# Patient Record
Sex: MALE | Race: BLACK OR AFRICAN AMERICAN | NOT HISPANIC OR LATINO | Employment: FULL TIME | URBAN - METROPOLITAN AREA
[De-identification: names, ages, dates, MRNs, and addresses within clinical notes are randomized per-mention and may not be internally consistent; named-entity substitution may affect disease eponyms.]

---

## 2020-11-14 ENCOUNTER — APPOINTMENT (OUTPATIENT)
Dept: RADIOLOGY | Facility: MEDICAL CENTER | Age: 55
End: 2020-11-14
Attending: EMERGENCY MEDICINE
Payer: OTHER MISCELLANEOUS

## 2020-11-14 ENCOUNTER — HOSPITAL ENCOUNTER (EMERGENCY)
Facility: MEDICAL CENTER | Age: 55
End: 2020-11-14
Attending: EMERGENCY MEDICINE
Payer: OTHER MISCELLANEOUS

## 2020-11-14 VITALS
HEART RATE: 65 BPM | WEIGHT: 245 LBS | BODY MASS INDEX: 37.13 KG/M2 | TEMPERATURE: 98.4 F | RESPIRATION RATE: 16 BRPM | OXYGEN SATURATION: 93 % | HEIGHT: 68 IN | DIASTOLIC BLOOD PRESSURE: 90 MMHG | SYSTOLIC BLOOD PRESSURE: 137 MMHG

## 2020-11-14 DIAGNOSIS — R51.9 ACUTE NONINTRACTABLE HEADACHE, UNSPECIFIED HEADACHE TYPE: ICD-10-CM

## 2020-11-14 DIAGNOSIS — R55 NEAR SYNCOPE: ICD-10-CM

## 2020-11-14 LAB
ALBUMIN SERPL BCP-MCNC: 4 G/DL (ref 3.2–4.9)
ALBUMIN/GLOB SERPL: 1.1 G/DL
ALP SERPL-CCNC: 73 U/L (ref 30–99)
ALT SERPL-CCNC: 37 U/L (ref 2–50)
ANION GAP SERPL CALC-SCNC: 12 MMOL/L (ref 7–16)
AST SERPL-CCNC: 26 U/L (ref 12–45)
BASOPHILS # BLD AUTO: 0.4 % (ref 0–1.8)
BASOPHILS # BLD: 0.02 K/UL (ref 0–0.12)
BILIRUB SERPL-MCNC: 0.3 MG/DL (ref 0.1–1.5)
BUN SERPL-MCNC: 9 MG/DL (ref 8–22)
CALCIUM SERPL-MCNC: 9.5 MG/DL (ref 8.5–10.5)
CHLORIDE SERPL-SCNC: 103 MMOL/L (ref 96–112)
CO2 SERPL-SCNC: 23 MMOL/L (ref 20–33)
CREAT SERPL-MCNC: 0.94 MG/DL (ref 0.5–1.4)
EKG IMPRESSION: NORMAL
EOSINOPHIL # BLD AUTO: 0.06 K/UL (ref 0–0.51)
EOSINOPHIL NFR BLD: 1.2 % (ref 0–6.9)
ERYTHROCYTE [DISTWIDTH] IN BLOOD BY AUTOMATED COUNT: 40.8 FL (ref 35.9–50)
GLOBULIN SER CALC-MCNC: 3.5 G/DL (ref 1.9–3.5)
GLUCOSE SERPL-MCNC: 101 MG/DL (ref 65–99)
HCT VFR BLD AUTO: 44.1 % (ref 42–52)
HGB BLD-MCNC: 15 G/DL (ref 14–18)
IMM GRANULOCYTES # BLD AUTO: 0.01 K/UL (ref 0–0.11)
IMM GRANULOCYTES NFR BLD AUTO: 0.2 % (ref 0–0.9)
LYMPHOCYTES # BLD AUTO: 1.41 K/UL (ref 1–4.8)
LYMPHOCYTES NFR BLD: 27.6 % (ref 22–41)
MCH RBC QN AUTO: 29.7 PG (ref 27–33)
MCHC RBC AUTO-ENTMCNC: 34 G/DL (ref 33.7–35.3)
MCV RBC AUTO: 87.3 FL (ref 81.4–97.8)
MONOCYTES # BLD AUTO: 0.33 K/UL (ref 0–0.85)
MONOCYTES NFR BLD AUTO: 6.5 % (ref 0–13.4)
NEUTROPHILS # BLD AUTO: 3.28 K/UL (ref 1.82–7.42)
NEUTROPHILS NFR BLD: 64.1 % (ref 44–72)
NRBC # BLD AUTO: 0 K/UL
NRBC BLD-RTO: 0 /100 WBC
PLATELET # BLD AUTO: 184 K/UL (ref 164–446)
PMV BLD AUTO: 9.1 FL (ref 9–12.9)
POTASSIUM SERPL-SCNC: 4 MMOL/L (ref 3.6–5.5)
PROT SERPL-MCNC: 7.5 G/DL (ref 6–8.2)
RBC # BLD AUTO: 5.05 M/UL (ref 4.7–6.1)
SODIUM SERPL-SCNC: 138 MMOL/L (ref 135–145)
TROPONIN T SERPL-MCNC: <6 NG/L (ref 6–19)
WBC # BLD AUTO: 5.1 K/UL (ref 4.8–10.8)

## 2020-11-14 PROCEDURE — 99285 EMERGENCY DEPT VISIT HI MDM: CPT

## 2020-11-14 PROCEDURE — 70450 CT HEAD/BRAIN W/O DYE: CPT

## 2020-11-14 PROCEDURE — 96374 THER/PROPH/DIAG INJ IV PUSH: CPT

## 2020-11-14 PROCEDURE — 71045 X-RAY EXAM CHEST 1 VIEW: CPT

## 2020-11-14 PROCEDURE — 93005 ELECTROCARDIOGRAM TRACING: CPT | Performed by: EMERGENCY MEDICINE

## 2020-11-14 PROCEDURE — 80053 COMPREHEN METABOLIC PANEL: CPT

## 2020-11-14 PROCEDURE — 85025 COMPLETE CBC W/AUTO DIFF WBC: CPT

## 2020-11-14 PROCEDURE — 700101 HCHG RX REV CODE 250: Performed by: EMERGENCY MEDICINE

## 2020-11-14 PROCEDURE — 93005 ELECTROCARDIOGRAM TRACING: CPT

## 2020-11-14 PROCEDURE — 84484 ASSAY OF TROPONIN QUANT: CPT

## 2020-11-14 RX ORDER — METOPROLOL TARTRATE 1 MG/ML
5 INJECTION, SOLUTION INTRAVENOUS ONCE
Status: COMPLETED | OUTPATIENT
Start: 2020-11-14 | End: 2020-11-14

## 2020-11-14 RX ADMIN — METOPROLOL TARTRATE 5 MG: 1 INJECTION, SOLUTION INTRAVENOUS at 08:40

## 2020-11-14 NOTE — ED TRIAGE NOTES
"Chief Complaint   Patient presents with   • Dizziness     pt bib ems from [facility] visiting from [place] c/o sudden onset of head ache/nausea then became \"woozy\" while in elevator. denies syncopal event   • Head Ache   • Nausea     Arrived aaox4. States head ache is now resolving but now c/o generalized weakness. No unilateral deficits noted. Has hx of SVT/HTN/Asthma. Has bgl of 98 on scene.  "

## 2020-11-14 NOTE — DISCHARGE INSTRUCTIONS
Go back to your hotel room and rest.  If you have another episode, develop any difficulty breathing or chest pain, palpitations, or any other concerns return to the ER.  Follow-up with your regular doctor when you get home.  Rest and drink plenty of fluids today.

## 2020-11-14 NOTE — ED PROVIDER NOTES
"ED Provider Note    Scribed for Heather Fish M.D. by Ginna Polanco. 11/14/2020, 8:07 AM.    Primary care provider: No primary care provider on file.  Means of arrival: Ambulance   History obtained from: Patient  History limited by: None    CHIEF COMPLAINT  Chief Complaint   Patient presents with   • Dizziness     pt bib ems from [facility] visiting from [place] c/o sudden onset of head ache/nausea then became \"woozy\" while in elevator. denies syncopal event   • Head Ache   • Nausea     HPI  Azeem Marvin is a 55 y.o. male who presents to the Emergency Department brought in by ambulance for sudden onset headache with associated nausea and dizziness today while in the elevator of the [facility]. Patient states he was on his way to get a bagel this morning when he suddenly experienced an occipital headache followed by nausea causing him to fall to the floor. His headache has since resolved, however he is now feeling generally weak, and still not at baseline. Patient states he will typically get intermittent headaches, however not to this extent. He denies doing any unusual activities last night and denies any alcohol intake. No known COVID exposure. Denies dysarthria, blurred vision, chest pain, unilateral weakness, or leg swelling. Denies a past medical history of diabetes but does confirm history of hypertension and SVT, although mentions that he has not had this in years. Patient takes Metoprolol. Not taking blood thinners.    REVIEW OF SYSTEMS  Pertinent positives include dizziness, nausea, headache, generalized weakness. Pertinent negatives include no syncope, dysarthria, blurred vision, chest pain, unilateral weakness, or leg swelling. All other systems reviewed and negative.     PAST MEDICAL HISTORY  Patient has a past medical history of Asthma, Hypertension, and SVT (supraventricular tachycardia) (HCC).    SURGICAL HISTORY  patient denies any surgical history    SOCIAL HISTORY  Social History " "    Tobacco Use   • Smoking status: Never Smoker   • Smokeless tobacco: Never Used   Substance Use Topics   • Drug use: Never      Social History     Substance and Sexual Activity   Drug Use Never       FAMILY HISTORY  History reviewed. No pertinent family history.    CURRENT MEDICATIONS  Home Medications     Reviewed by Genoveva Calhoun R.N. (Registered Nurse) on 11/14/20 at 0754  Med List Status: Complete   Medication Last Dose Status   METOPROLOL TARTRATE PO taking Active                ALLERGIES  No Known Allergies    PHYSICAL EXAM  VITAL SIGNS: /94   Pulse 78   Temp 36.9 °C (98.4 °F) (Temporal)   Resp 14   Ht 1.727 m (5' 8\")   Wt 111.1 kg (245 lb)   SpO2 98%   BMI 37.25 kg/m²     Constitutional: Well developed, No acute distress, Non-toxic appearance.   HENT: Normocephalic, Atraumatic, Bilateral external ears normal, Oropharynx moist, Nose normal.   Eyes: PERRL, EOMI, Conjunctiva normal  Neck: Normal range of motion, No tenderness, Supple, no meningeal signs   Cardiovascular: Normal heart rate, Normal rhythm  Thorax & Lungs: Normal breath sounds, No respiratory distress   Abdomen: Benign abdominal exam, no guarding no rebound, no pulsatile mass, no tenderness, no distention  Skin: Warm, Dry, No erythema, No rash.   Back: No tenderness, No CVA tenderness.   Extremities: Intact distal pulses, No edema, No tenderness   Neurologic: Alert & oriented x 3, Normal motor function, Normal sensory function, No focal deficits noted. No pronator drift, no facial asymmetry. Steady gait.  Psychiatric: Appropriate                         DIAGNOSTIC STUDIES / PROCEDURES    LABS  Results for orders placed or performed during the hospital encounter of 11/14/20   CBC WITH DIFFERENTIAL   Result Value Ref Range    WBC 5.1 4.8 - 10.8 K/uL    RBC 5.05 4.70 - 6.10 M/uL    Hemoglobin 15.0 14.0 - 18.0 g/dL    Hematocrit 44.1 42.0 - 52.0 %    MCV 87.3 81.4 - 97.8 fL    MCH 29.7 27.0 - 33.0 pg    MCHC 34.0 33.7 - 35.3 g/dL    " RDW 40.8 35.9 - 50.0 fL    Platelet Count 184 164 - 446 K/uL    MPV 9.1 9.0 - 12.9 fL    Neutrophils-Polys 64.10 44.00 - 72.00 %    Lymphocytes 27.60 22.00 - 41.00 %    Monocytes 6.50 0.00 - 13.40 %    Eosinophils 1.20 0.00 - 6.90 %    Basophils 0.40 0.00 - 1.80 %    Immature Granulocytes 0.20 0.00 - 0.90 %    Nucleated RBC 0.00 /100 WBC    Neutrophils (Absolute) 3.28 1.82 - 7.42 K/uL    Lymphs (Absolute) 1.41 1.00 - 4.80 K/uL    Monos (Absolute) 0.33 0.00 - 0.85 K/uL    Eos (Absolute) 0.06 0.00 - 0.51 K/uL    Baso (Absolute) 0.02 0.00 - 0.12 K/uL    Immature Granulocytes (abs) 0.01 0.00 - 0.11 K/uL    NRBC (Absolute) 0.00 K/uL   COMP METABOLIC PANEL   Result Value Ref Range    Sodium 138 135 - 145 mmol/L    Potassium 4.0 3.6 - 5.5 mmol/L    Chloride 103 96 - 112 mmol/L    Co2 23 20 - 33 mmol/L    Anion Gap 12.0 7.0 - 16.0    Glucose 101 (H) 65 - 99 mg/dL    Bun 9 8 - 22 mg/dL    Creatinine 0.94 0.50 - 1.40 mg/dL    Calcium 9.5 8.5 - 10.5 mg/dL    AST(SGOT) 26 12 - 45 U/L    ALT(SGPT) 37 2 - 50 U/L    Alkaline Phosphatase 73 30 - 99 U/L    Total Bilirubin 0.3 0.1 - 1.5 mg/dL    Albumin 4.0 3.2 - 4.9 g/dL    Total Protein 7.5 6.0 - 8.2 g/dL    Globulin 3.5 1.9 - 3.5 g/dL    A-G Ratio 1.1 g/dL   TROPONIN   Result Value Ref Range    Troponin T <6 6 - 19 ng/L   ESTIMATED GFR   Result Value Ref Range    GFR If African American >60 >60 mL/min/1.73 m 2    GFR If Non African American >60 >60 mL/min/1.73 m 2   EKG   Result Value Ref Range    Report       [facility] Emergency Dept.    Test Date:  2020  Pt Name:    RAHEEL FLORES              Department: ER  MRN:        [id number]                      Room:        14  Gender:     Male                         Technician: [id number]  :        1965                   Requested By:ER TRIAGE PROTOCOL  Order #:    [id number]                    Reading MD: Heather Kessler    Measurements  Intervals                                Axis  Rate:       75                            P:          30  KS:         168                          QRS:        34  QRSD:       76                           T:          13  QT:         368  QTc:        411    Interpretive Statements  SINUS RHYTHM  No previous ECG available for comparison  flipped t wave lead III  Electronically Signed On 11- 8:19:14 PST by Heather Kessler       All labs reviewed by me.    RADIOLOGY  CT-HEAD W/O   Final Result      No evidence of acute intracranial process.      DX-CHEST-PORTABLE (1 VIEW)   Final Result      No acute cardiac or pulmonary abnormality is noted.        The radiologist's interpretation of all radiological studies have been reviewed by me.    COURSE & MEDICAL DECISION MAKING  Nursing notes, VS, PMSFHx reviewed in chart.     Patient presents to the emergency department after an episode of headache and nausea and feeling lightheaded.  He did not have a syncopal episode but did become significantly lightheaded that he had to sit down.  Upon arrival to emergency department his headache has completely resolved.  He denies any palpitations related to this episode and his EKG does not show evidence of an arrhythmia.  He has a nonfocal neurologic exam.  Denies any recent alcohol or substance use.  Benign physical exam except for elevated blood pressure.  I think it is unlikely that the patient had a subarachnoid bleed causing his headache as his headache is completely resolved upon arrival to the ER without any medications or interventions.  Patient is not had any fevers and I doubt an infectious etiology.  He denies focal neurologic findings and therefore I highly doubt stroke.  He does have a history is of headaches in the past that were similar except he does state this was slightly more intense.    8:07 AM - Patient seen and examined at bedside. Plan of care was discussed with him which includes checking his cardiac enzymes, chest x-ray, and basic labs to rule out anemia or abnormal  "kidney function. Additionally, we discussed CT-head to rule out intercranial abnormalities and he is understanding and agrees with the plan of care. The patient presents with dizziness, headache, and nausea and the differential diagnosis includes but is not limited to cardiac arrhythmia, vasovagal, dehydration, doubt intercranial pathology. No symptoms to suggest stroke. Ordered for CT-head, DX-chest, CBC with differential, CMP, troponin, and EKG to evaluate. Patient was treated with Metoprolol 5 mg for his elevated blood pressure, currently 136/94.     9:05 AM - Recheck. Updated the patient on his lab and radiology results indicating no evidence of MI, SVT, anemia or elevated white count to suggest infection. Chest x-ray and CT brain are unremarkable. Patient is advised to follow-up with his PCP when he returns home and understands to return to the ED if symptoms return. /106   Pulse 78   Temp 36.9 °C (98.4 °F) (Temporal)   Resp 14   Ht 1.727 m (5' 8\")   Wt 111.1 kg (245 lb)   SpO2 98%   BMI 37.25 kg/m²     Patient has a normal white count without evidence of bandemia.  There is no evidence of anemia.  There is no evidence of significant electrolyte abnormalities.  Troponin was normal.  CT scan was unremarkable as well as chest x-ray.  Patient has not had any return of his headache or symptoms.  His blood pressure has improved with a dose of metoprolol.  Repeat neurologic exam is intact and nonfocal.  At this point is unclear what happened earlier today.    With the current crisis standards of care due to COVID, I think outpatient management appropriate.  He was advised to return immediately if he has a return of his headache, passes out or has any new or different symptoms.    The patient will return for new or worsening symptoms and is stable at the time of discharge.    The patient is referred to a primary physician for blood pressure management, diabetic screening, and for all other preventative " health concerns.    DISPOSITION:  Patient will be discharged home in stable condition.    FOLLOW UP:  No follow-up provider specified.    OUTPATIENT MEDICATIONS:  New Prescriptions    No medications on file            FINAL IMPRESSION  1. Near syncope    2. Acute nonintractable headache, unspecified headache type          I, Ginna Polanco (Kerri), am scribing for, and in the presence of, Heather Fish M.D..    Electronically signed by: Ginna Polanco (Elishaibe), 11/14/2020    I, Heather Fish M.D. personally performed the services described in this documentation, as scribed by Ginna Polanco in my presence, and it is both accurate and complete. C    The note accurately reflects work and decisions made by me.  Heather Fish M.D.  11/14/2020  9:24 AM

## 2021-11-05 ENCOUNTER — HOSPITAL ENCOUNTER (EMERGENCY)
Facility: MEDICAL CENTER | Age: 56
End: 2021-11-06
Attending: EMERGENCY MEDICINE
Payer: OTHER MISCELLANEOUS

## 2021-11-05 ENCOUNTER — APPOINTMENT (OUTPATIENT)
Dept: RADIOLOGY | Facility: MEDICAL CENTER | Age: 56
End: 2021-11-05
Attending: EMERGENCY MEDICINE
Payer: OTHER MISCELLANEOUS

## 2021-11-05 VITALS
OXYGEN SATURATION: 98 % | HEART RATE: 72 BPM | HEIGHT: 69 IN | RESPIRATION RATE: 13 BRPM | BODY MASS INDEX: 39.28 KG/M2 | SYSTOLIC BLOOD PRESSURE: 164 MMHG | TEMPERATURE: 98.3 F | DIASTOLIC BLOOD PRESSURE: 90 MMHG | WEIGHT: 265.21 LBS

## 2021-11-05 DIAGNOSIS — R03.0 ELEVATED BLOOD PRESSURE READING: ICD-10-CM

## 2021-11-05 LAB
ALBUMIN SERPL BCP-MCNC: 4.2 G/DL (ref 3.2–4.9)
ALBUMIN/GLOB SERPL: 1.2 G/DL
ALP SERPL-CCNC: 78 U/L (ref 30–99)
ALT SERPL-CCNC: 19 U/L (ref 2–50)
ANION GAP SERPL CALC-SCNC: 11 MMOL/L (ref 7–16)
AST SERPL-CCNC: 19 U/L (ref 12–45)
BASOPHILS # BLD AUTO: 0.4 % (ref 0–1.8)
BASOPHILS # BLD: 0.03 K/UL (ref 0–0.12)
BILIRUB SERPL-MCNC: 0.3 MG/DL (ref 0.1–1.5)
BUN SERPL-MCNC: 11 MG/DL (ref 8–22)
CALCIUM SERPL-MCNC: 9.3 MG/DL (ref 8.5–10.5)
CHLORIDE SERPL-SCNC: 105 MMOL/L (ref 96–112)
CO2 SERPL-SCNC: 24 MMOL/L (ref 20–33)
CREAT SERPL-MCNC: 1.03 MG/DL (ref 0.5–1.4)
EKG IMPRESSION: NORMAL
EOSINOPHIL # BLD AUTO: 0.1 K/UL (ref 0–0.51)
EOSINOPHIL NFR BLD: 1.4 % (ref 0–6.9)
ERYTHROCYTE [DISTWIDTH] IN BLOOD BY AUTOMATED COUNT: 40.9 FL (ref 35.9–50)
GLOBULIN SER CALC-MCNC: 3.4 G/DL (ref 1.9–3.5)
GLUCOSE SERPL-MCNC: 97 MG/DL (ref 65–99)
HCT VFR BLD AUTO: 43.7 % (ref 42–52)
HGB BLD-MCNC: 15.5 G/DL (ref 14–18)
IMM GRANULOCYTES # BLD AUTO: 0.03 K/UL (ref 0–0.11)
IMM GRANULOCYTES NFR BLD AUTO: 0.4 % (ref 0–0.9)
LYMPHOCYTES # BLD AUTO: 2.27 K/UL (ref 1–4.8)
LYMPHOCYTES NFR BLD: 32.1 % (ref 22–41)
MCH RBC QN AUTO: 31 PG (ref 27–33)
MCHC RBC AUTO-ENTMCNC: 35.5 G/DL (ref 33.7–35.3)
MCV RBC AUTO: 87.4 FL (ref 81.4–97.8)
MONOCYTES # BLD AUTO: 0.55 K/UL (ref 0–0.85)
MONOCYTES NFR BLD AUTO: 7.8 % (ref 0–13.4)
NEUTROPHILS # BLD AUTO: 4.09 K/UL (ref 1.82–7.42)
NEUTROPHILS NFR BLD: 57.9 % (ref 44–72)
NRBC # BLD AUTO: 0 K/UL
NRBC BLD-RTO: 0 /100 WBC
PLATELET # BLD AUTO: 198 K/UL (ref 164–446)
PMV BLD AUTO: 9.1 FL (ref 9–12.9)
POTASSIUM SERPL-SCNC: 4 MMOL/L (ref 3.6–5.5)
PROT SERPL-MCNC: 7.6 G/DL (ref 6–8.2)
RBC # BLD AUTO: 5 M/UL (ref 4.7–6.1)
SODIUM SERPL-SCNC: 140 MMOL/L (ref 135–145)
TROPONIN T SERPL-MCNC: <6 NG/L (ref 6–19)
WBC # BLD AUTO: 7.1 K/UL (ref 4.8–10.8)

## 2021-11-05 PROCEDURE — 84484 ASSAY OF TROPONIN QUANT: CPT

## 2021-11-05 PROCEDURE — 99283 EMERGENCY DEPT VISIT LOW MDM: CPT

## 2021-11-05 PROCEDURE — 71045 X-RAY EXAM CHEST 1 VIEW: CPT

## 2021-11-05 PROCEDURE — 80053 COMPREHEN METABOLIC PANEL: CPT

## 2021-11-05 PROCEDURE — 85025 COMPLETE CBC W/AUTO DIFF WBC: CPT

## 2021-11-05 PROCEDURE — 93005 ELECTROCARDIOGRAM TRACING: CPT | Performed by: EMERGENCY MEDICINE

## 2021-11-05 ASSESSMENT — LIFESTYLE VARIABLES
DO YOU DRINK ALCOHOL: YES
EVER FELT BAD OR GUILTY ABOUT YOUR DRINKING: NO
EVER HAD A DRINK FIRST THING IN THE MORNING TO STEADY YOUR NERVES TO GET RID OF A HANGOVER: NO
HAVE PEOPLE ANNOYED YOU BY CRITICIZING YOUR DRINKING: NO
TOTAL SCORE: 0
CONSUMPTION TOTAL: INCOMPLETE
TOTAL SCORE: 0
TOTAL SCORE: 0
HAVE YOU EVER FELT YOU SHOULD CUT DOWN ON YOUR DRINKING: NO

## 2021-11-05 ASSESSMENT — FIBROSIS 4 INDEX: FIB4 SCORE: 1.3

## 2021-11-06 NOTE — ED PROVIDER NOTES
"ED Provider Note    CHIEF COMPLAINT  Chief Complaint   Patient presents with   • Blood Pressure Problem     noticed his BP is elevated when he checked it at home and ohkim=948/123. has hx of HTN. denies any symptoms. neuro intact. denies chest pain/sob/dizziness. no vision change/no head ache       HPI  Azeem Marvin is a 56 y.o. male who presents for evaluation of elevated blood pressure readings.  Patient notes he is visiting here in town and noted his wrist blood pressure monitor to have readings in the \"triple digits.  He noted it got as high as 190/120.  He notes no chest pain, shortness of breath, nausea, vomiting, sweating, or headaches.  He has had no vision changes and has not had any recent illnesses.  He notes he takes metoprolol succinate 25 mg once a day and is supposed to be on another medication however he heard that one of the side effects is dizziness so he has not tried the second medication.  He thinks it is lisinopril.    REVIEW OF SYSTEMS  Constitutional: No fevers or chills  Skin: No rashes  HEENT: No sore throat, runny nose, sores, trouble swallowing, trouble speaking.  Neck: No neck pain  Chest: No pain   Pulm: No shortness of breath, cough, wheezing, stridor, or pain with inspiration/expiration  Gastrointestinal: No nausea, vomiting, diarrhea,  or abdominal pain.  Genitourinary: No dysuria or hematuria  Musculoskeletal: No pain, swelling, weakness  Neurologic: No numbness, tingling, or focal motor weakness. No confusion or disorientation.  Heme: No bleeding or bruising problems.   Immuno: No hx of recurrent infections    PAST FAM HISTORY  No family history on file.    PAST MEDICAL HISTORY   has a past medical history of Asthma, Hypertension, and SVT (supraventricular tachycardia) ([facility]).    SOCIAL HISTORY  Social History     Tobacco Use   • Smoking status: Never Smoker   • Smokeless tobacco: Never Used   Vaping Use   • Vaping Use: Never assessed   Substance and Sexual Activity   • Alcohol " "use: Not on file   • Drug use: Never   • Sexual activity: Not on file       SURGICAL HISTORY  patient denies any surgical history    CURRENT MEDICATIONS  Home Medications     Reviewed by Genoveva Calhoun R.N. (Registered Nurse) on 11/05/21 at 1813  Med List Status: Partial   Medication Last Dose Status   METOPROLOL TARTRATE PO  Active                ALLERGIES  No Known Allergies    PHYSICAL EXAM  VITAL SIGNS: BP (!) 165/114   Pulse 73   Temp 36.8 °C (98.3 °F) (Oral)   Resp 16   Ht 1.753 m (5' 9\")   Wt 120 kg (265 lb 3.4 oz)   SpO2 95%   BMI 39.17 kg/m²    Gen: Alert in no apparent distress.  HEENT: No signs of trauma, Bilateral external ears normal, Nose normal. Conjunctiva normal, Non-icteric.   Cardiovascular: Regular rate and rhythm, no murmurs.  Capillary refill less than 3 seconds to all extremities, 2+ distal pulses.  Thorax & Lungs: Normal breath sounds, No respiratory distress, No wheezing bilateral chest rise  Abdomen: Bowel sounds normal, Soft, No tenderness, No masses, No pulsatile masses. No Guarding or rebound  Skin: Warm, Dry  Extremities: Intact distal pulses, No edema  Neurologic: Alert , no facial droop, grossly normal coordination and strength  Psychiatric: Affect pleasant    LABS  Results for orders placed or performed during the hospital encounter of 11/05/21   CBC WITH DIFFERENTIAL   Result Value Ref Range    WBC 7.1 4.8 - 10.8 K/uL    RBC 5.00 4.70 - 6.10 M/uL    Hemoglobin 15.5 14.0 - 18.0 g/dL    Hematocrit 43.7 42.0 - 52.0 %    MCV 87.4 81.4 - 97.8 fL    MCH 31.0 27.0 - 33.0 pg    MCHC 35.5 (H) 33.7 - 35.3 g/dL    RDW 40.9 35.9 - 50.0 fL    Platelet Count 198 164 - 446 K/uL    MPV 9.1 9.0 - 12.9 fL    Neutrophils-Polys 57.90 44.00 - 72.00 %    Lymphocytes 32.10 22.00 - 41.00 %    Monocytes 7.80 0.00 - 13.40 %    Eosinophils 1.40 0.00 - 6.90 %    Basophils 0.40 0.00 - 1.80 %    Immature Granulocytes 0.40 0.00 - 0.90 %    Nucleated RBC 0.00 /100 WBC    Neutrophils (Absolute) 4.09 1.82 - " 7.42 K/uL    Lymphs (Absolute) 2.27 1.00 - 4.80 K/uL    Monos (Absolute) 0.55 0.00 - 0.85 K/uL    Eos (Absolute) 0.10 0.00 - 0.51 K/uL    Baso (Absolute) 0.03 0.00 - 0.12 K/uL    Immature Granulocytes (abs) 0.03 0.00 - 0.11 K/uL    NRBC (Absolute) 0.00 K/uL   COMP METABOLIC PANEL   Result Value Ref Range    Sodium 140 135 - 145 mmol/L    Potassium 4.0 3.6 - 5.5 mmol/L    Chloride 105 96 - 112 mmol/L    Co2 24 20 - 33 mmol/L    Anion Gap 11.0 7.0 - 16.0    Glucose 97 65 - 99 mg/dL    Bun 11 8 - 22 mg/dL    Creatinine 1.03 0.50 - 1.40 mg/dL    Calcium 9.3 8.5 - 10.5 mg/dL    AST(SGOT) 19 12 - 45 U/L    ALT(SGPT) 19 2 - 50 U/L    Alkaline Phosphatase 78 30 - 99 U/L    Total Bilirubin 0.3 0.1 - 1.5 mg/dL    Albumin 4.2 3.2 - 4.9 g/dL    Total Protein 7.6 6.0 - 8.2 g/dL    Globulin 3.4 1.9 - 3.5 g/dL    A-G Ratio 1.2 g/dL   TROPONIN   Result Value Ref Range    Troponin T <6 6 - 19 ng/L   ESTIMATED GFR   Result Value Ref Range    GFR If African American >60 >60 mL/min/1.73 m 2    GFR If Non African American >60 >60 mL/min/1.73 m 2   EKG (NOW)   Result Value Ref Range    Report       [facility] Emergency Dept.    Test Date:  2021  Pt Name:    RAHEEL FLORES              Department: ER  MRN:        [id number]                      Room:       BL 21  Gender:     Male                         Technician: [id number]  :        1965                   Requested By:THA MCGUIRE  Order #:    [id number]                    Reading MD:    Measurements  Intervals                                Axis  Rate:       69                           P:          38  OK:         176                          QRS:        37  QRSD:       85                           T:          17  QT:         372  QTc:        399    Interpretive Statements  Sinus rhythm  Minimal ST elevation, anterior leads  Compared to ECG 2020 07:50:18  ST (T wave) deviation now present         RADIOLOGY  DX-CHEST-PORTABLE (1 VIEW)   Final Result          1. No acute cardiopulmonary abnormalities are identified.          COURSE & MEDICAL DECISION MAKING  Patient arrives for evaluation asymptomatic elevated blood pressure on his home monitor.  Patient has excellent pulses and perfusion bilaterally and is asymptomatic arguing against any significant pathology as the cause or the result of the hypertensive readings.  Will obtain screening labs including an EKG, CMP, CBC, troponin, and chest x-ray.  These are normal, and the patient does not develop any interval issues, I feel he will be dischargeable with outpatient follow-up.  I feel it is reasonable to simply watch his blood pressure in the meantime as it may trend down without any intervention.  I will speak with him after the labs are back about attempting treatment in the ED.  As far as changing his prescriptions as an outpatient, I feel it is reasonable for him to speak with his primary care physician if he is concerned about side effects of his other red blood per medication.    11:14 PM  Reevaluated patient at bedside, he has not developed any interval symptoms and his blood pressure is now 160/90.  Given this, I feel his blood pressure has resolved to a sufficient degree that further medication, either in the ED or as a prescription, is not warranted and is not in the patient's best interest as it may drop his blood pressure too low.  He was encouraged to contact his primary care physician next week to discuss the incident.  He will be given strict return precautions.    FINAL IMPRESSION  1. Elevated blood pressure reading        Electronically signed by: on his home monitor. Marino Lott M.D., 11/5/2021 9:18 PM

## 2021-11-06 NOTE — ED TRIAGE NOTES
Chief Complaint   Patient presents with   • Blood Pressure Problem     noticed his BP is elevated when he checked it at home and xxoij=125/123. has hx of HTN. denies any symptoms. neuro intact. denies chest pain/sob/dizziness. no vision change/no head ache     NAD. Educated on triage process. Instructed to notify staff for any worsening symptoms. Denies any recent travel. Denies exposure to known covid positive patients. Denies any respiratory symptoms.

## 2022-11-05 ENCOUNTER — HOSPITAL ENCOUNTER (EMERGENCY)
Facility: MEDICAL CENTER | Age: 57
End: 2022-11-06
Attending: STUDENT IN AN ORGANIZED HEALTH CARE EDUCATION/TRAINING PROGRAM
Payer: COMMERCIAL

## 2022-11-05 ENCOUNTER — APPOINTMENT (OUTPATIENT)
Dept: RADIOLOGY | Facility: MEDICAL CENTER | Age: 57
End: 2022-11-05
Attending: STUDENT IN AN ORGANIZED HEALTH CARE EDUCATION/TRAINING PROGRAM
Payer: COMMERCIAL

## 2022-11-05 VITALS
RESPIRATION RATE: 11 BRPM | OXYGEN SATURATION: 94 % | HEART RATE: 90 BPM | HEIGHT: 69 IN | SYSTOLIC BLOOD PRESSURE: 196 MMHG | WEIGHT: 250 LBS | DIASTOLIC BLOOD PRESSURE: 101 MMHG | BODY MASS INDEX: 37.03 KG/M2

## 2022-11-05 DIAGNOSIS — R00.2 PALPITATIONS: ICD-10-CM

## 2022-11-05 DIAGNOSIS — I10 HYPERTENSION, UNSPECIFIED TYPE: ICD-10-CM

## 2022-11-05 DIAGNOSIS — E87.6 HYPOKALEMIA: ICD-10-CM

## 2022-11-05 LAB
ALBUMIN SERPL BCP-MCNC: 3.9 G/DL (ref 3.2–4.9)
ALBUMIN/GLOB SERPL: 1.3 G/DL
ALP SERPL-CCNC: 81 U/L (ref 30–99)
ALT SERPL-CCNC: 22 U/L (ref 2–50)
ANION GAP SERPL CALC-SCNC: 11 MMOL/L (ref 7–16)
AST SERPL-CCNC: 17 U/L (ref 12–45)
BASOPHILS # BLD AUTO: 0.3 % (ref 0–1.8)
BASOPHILS # BLD: 0.02 K/UL (ref 0–0.12)
BILIRUB SERPL-MCNC: 0.3 MG/DL (ref 0.1–1.5)
BUN SERPL-MCNC: 13 MG/DL (ref 8–22)
CALCIUM SERPL-MCNC: 8.9 MG/DL (ref 8.5–10.5)
CHLORIDE SERPL-SCNC: 106 MMOL/L (ref 96–112)
CO2 SERPL-SCNC: 23 MMOL/L (ref 20–33)
CREAT SERPL-MCNC: 1.05 MG/DL (ref 0.5–1.4)
EKG IMPRESSION: NORMAL
EOSINOPHIL # BLD AUTO: 0.08 K/UL (ref 0–0.51)
EOSINOPHIL NFR BLD: 1.3 % (ref 0–6.9)
ERYTHROCYTE [DISTWIDTH] IN BLOOD BY AUTOMATED COUNT: 41.1 FL (ref 35.9–50)
GFR SERPLBLD CREATININE-BSD FMLA CKD-EPI: 83 ML/MIN/1.73 M 2
GLOBULIN SER CALC-MCNC: 3.1 G/DL (ref 1.9–3.5)
GLUCOSE SERPL-MCNC: 199 MG/DL (ref 65–99)
HCT VFR BLD AUTO: 41.2 % (ref 42–52)
HGB BLD-MCNC: 14.1 G/DL (ref 14–18)
IMM GRANULOCYTES # BLD AUTO: 0.02 K/UL (ref 0–0.11)
IMM GRANULOCYTES NFR BLD AUTO: 0.3 % (ref 0–0.9)
LYMPHOCYTES # BLD AUTO: 1.94 K/UL (ref 1–4.8)
LYMPHOCYTES NFR BLD: 32.3 % (ref 22–41)
MCH RBC QN AUTO: 30.1 PG (ref 27–33)
MCHC RBC AUTO-ENTMCNC: 34.2 G/DL (ref 33.7–35.3)
MCV RBC AUTO: 88 FL (ref 81.4–97.8)
MONOCYTES # BLD AUTO: 0.47 K/UL (ref 0–0.85)
MONOCYTES NFR BLD AUTO: 7.8 % (ref 0–13.4)
NEUTROPHILS # BLD AUTO: 3.48 K/UL (ref 1.82–7.42)
NEUTROPHILS NFR BLD: 58 % (ref 44–72)
NRBC # BLD AUTO: 0 K/UL
NRBC BLD-RTO: 0 /100 WBC
NT-PROBNP SERPL IA-MCNC: <5 PG/ML (ref 0–125)
PLATELET # BLD AUTO: 189 K/UL (ref 164–446)
PMV BLD AUTO: 9 FL (ref 9–12.9)
POTASSIUM SERPL-SCNC: 3.5 MMOL/L (ref 3.6–5.5)
PROT SERPL-MCNC: 7 G/DL (ref 6–8.2)
RBC # BLD AUTO: 4.68 M/UL (ref 4.7–6.1)
SODIUM SERPL-SCNC: 140 MMOL/L (ref 135–145)
TROPONIN T SERPL-MCNC: <6 NG/L (ref 6–19)
WBC # BLD AUTO: 6 K/UL (ref 4.8–10.8)

## 2022-11-05 PROCEDURE — 99283 EMERGENCY DEPT VISIT LOW MDM: CPT

## 2022-11-05 PROCEDURE — 93005 ELECTROCARDIOGRAM TRACING: CPT

## 2022-11-05 PROCEDURE — 85025 COMPLETE CBC W/AUTO DIFF WBC: CPT

## 2022-11-05 PROCEDURE — 36415 COLL VENOUS BLD VENIPUNCTURE: CPT

## 2022-11-05 PROCEDURE — 83880 ASSAY OF NATRIURETIC PEPTIDE: CPT

## 2022-11-05 PROCEDURE — 93005 ELECTROCARDIOGRAM TRACING: CPT | Performed by: STUDENT IN AN ORGANIZED HEALTH CARE EDUCATION/TRAINING PROGRAM

## 2022-11-05 PROCEDURE — 80053 COMPREHEN METABOLIC PANEL: CPT

## 2022-11-05 PROCEDURE — 71045 X-RAY EXAM CHEST 1 VIEW: CPT

## 2022-11-05 PROCEDURE — 84484 ASSAY OF TROPONIN QUANT: CPT

## 2022-11-05 ASSESSMENT — FIBROSIS 4 INDEX: FIB4 SCORE: 1.25

## 2022-11-06 LAB — EKG IMPRESSION: NORMAL

## 2022-11-06 NOTE — ED PROVIDER NOTES
ED Provider Note    CHIEF COMPLAINT  Chief Complaint   Patient presents with    Palpitations     Pt reports he woke up with palpitations, hx of SVT  Denies chest pain, denies SOB. Has not taken HTN med tonight       HPI  Azeem Marvin is a 57 y.o. male who presents after brief, resolved episode of palpitations.  He states that he was going to bed earlier this evening when he suddenly felt his heart racing.  He said it lasted about 10 minutes and then resolved spontaneously.  He had no associated chest pain or shortness of breath.  Onset was not with exertion .  He did not feel lightheaded and he did not syncopized.  He did feel slightly nauseated but did not have any vomiting.  Has no recent illness, no fevers, chills, cough or URI symptoms.  He does have a remote history of SVT about 3 years ago.  He did not have an ablation procedure as he said that his symptoms resolved spontaneously prior to intervention. He does not take medications for it.  He has hypertension but otherwise no known medical problems.  He has no family history of early cardiac disease.    Patient denies any recent prolonged periods of immobilization (including hospitalization, long plane flight), no recent surgery, no recent traumatic injury, hemoptysis, or history of malignancy, DVT, PE or smoking.  Denies unilateral leg swelling.       REVIEW OF SYSTEMS  As per HPI, otherwise a 10 point review of systems is negative    PAST MEDICAL HISTORY  Past Medical History:   Diagnosis Date    Asthma     Hypertension     SVT (supraventricular tachycardia) (HCC)        SOCIAL HISTORY  Social History     Tobacco Use    Smoking status: Never    Smokeless tobacco: Never   Substance Use Topics    Drug use: Never       SURGICAL HISTORY  No past surgical history on file.    CURRENT MEDICATIONS  Home Medications       Reviewed by Macy Castaneda R.N. (Registered Nurse) on 11/05/22 at 2302  Med List Status: Not Addressed     Medication Last Dose Status  "  METOPROLOL TARTRATE PO  Active                    ALLERGIES  No Known Allergies    PHYSICAL EXAM  VITAL SIGNS: BP (!) 196/101   Pulse 90   Resp (!) 11   Ht 1.753 m (5' 9\")   Wt 113 kg (250 lb)   SpO2 94%   BMI 36.92 kg/m²    Constitutional: Awake and alert . No distress  HENT: Normal inspection  . Moist mucous membranes  Eyes: Normal inspection  Neck: Grossly normal range of motion.  Cardiovascular: Normal heart rate, Normal rhythm.  Symmetric peripheral pulses.   Thorax & Lungs: No respiratory distress, No wheezing, No rales, No rhonchi, No chest tenderness.   Abdomen: Soft, non-distended, nontender, no mass  Skin: No obvious rash.  Back: No tenderness, No CVA tenderness.   Extremities: Warm, well perfused. No clubbing, cyanosis, edema,   Neurologic: Grossly normal   Psychiatric: Normal for situation    RADIOLOGY/PROCEDURES  DX-CHEST-PORTABLE (1 VIEW)   Final Result      1.  Similar linear opacities at the left base. Scar versus atelectasis           Imaging is interpreted by radiologist    Labs:  Results for orders placed or performed during the hospital encounter of 11/05/22   CBC WITH DIFFERENTIAL   Result Value Ref Range    WBC 6.0 4.8 - 10.8 K/uL    RBC 4.68 (L) 4.70 - 6.10 M/uL    Hemoglobin 14.1 14.0 - 18.0 g/dL    Hematocrit 41.2 (L) 42.0 - 52.0 %    MCV 88.0 81.4 - 97.8 fL    MCH 30.1 27.0 - 33.0 pg    MCHC 34.2 33.7 - 35.3 g/dL    RDW 41.1 35.9 - 50.0 fL    Platelet Count 189 164 - 446 K/uL    MPV 9.0 9.0 - 12.9 fL    Neutrophils-Polys 58.00 44.00 - 72.00 %    Lymphocytes 32.30 22.00 - 41.00 %    Monocytes 7.80 0.00 - 13.40 %    Eosinophils 1.30 0.00 - 6.90 %    Basophils 0.30 0.00 - 1.80 %    Immature Granulocytes 0.30 0.00 - 0.90 %    Nucleated RBC 0.00 /100 WBC    Neutrophils (Absolute) 3.48 1.82 - 7.42 K/uL    Lymphs (Absolute) 1.94 1.00 - 4.80 K/uL    Monos (Absolute) 0.47 0.00 - 0.85 K/uL    Eos (Absolute) 0.08 0.00 - 0.51 K/uL    Baso (Absolute) 0.02 0.00 - 0.12 K/uL    Immature " Granulocytes (abs) 0.02 0.00 - 0.11 K/uL    NRBC (Absolute) 0.00 K/uL   COMP METABOLIC PANEL   Result Value Ref Range    Sodium 140 135 - 145 mmol/L    Potassium 3.5 (L) 3.6 - 5.5 mmol/L    Chloride 106 96 - 112 mmol/L    Co2 23 20 - 33 mmol/L    Anion Gap 11.0 7.0 - 16.0    Glucose 199 (H) 65 - 99 mg/dL    Bun 13 8 - 22 mg/dL    Creatinine 1.05 0.50 - 1.40 mg/dL    Calcium 8.9 8.5 - 10.5 mg/dL    AST(SGOT) 17 12 - 45 U/L    ALT(SGPT) 22 2 - 50 U/L    Alkaline Phosphatase 81 30 - 99 U/L    Total Bilirubin 0.3 0.1 - 1.5 mg/dL    Albumin 3.9 3.2 - 4.9 g/dL    Total Protein 7.0 6.0 - 8.2 g/dL    Globulin 3.1 1.9 - 3.5 g/dL    A-G Ratio 1.3 g/dL   TROPONIN   Result Value Ref Range    Troponin T <6 6 - 19 ng/L   proBrain Natriuretic Peptide, NT   Result Value Ref Range    NT-proBNP <5 0 - 125 pg/mL   ESTIMATED GFR   Result Value Ref Range    GFR (CKD-EPI) 83 >60 mL/min/1.73 m 2   EKG   Result Value Ref Range    Report       [facility] Emergency Dept.    Test Date:  2022  Pt Name:    RAHEEL FLORES              Department: ER  MRN:        [id number]                      Room:        03  Gender:     Male                         Technician: [id number]  :        1965                   Requested By:ER TRIAGE PROTOCOL  Order #:    [id number]                    Reading MD:    Measurements  Intervals                                Axis  Rate:       95                           P:          52  WY:         166                          QRS:        43  QRSD:       82                           T:          -5  QT:         334  QTc:        420    Interpretive Statements  Sinus rhythm  Borderline T wave abnormalities  Compared to ECG 2021 22:01:36  T-wave abnormality now present  ST (T wave) deviation no longer present       I reviewed the EKG.  The T wave abnormality appears to be an artifact (as documented above).  Has normal rate, normal rhythm, normal axis.  No ST wave elevations or depressions.   My interpretation is normal EKG      Medications - No data to display    Measures:  HTN/IDDM FOLLOW UP:  The patient has known hypertension and is being followed by their primary care doctor    COURSE & MEDICAL DECISION MAKING  This is a 57-year-old with a history of hypertension and a remote history of SVT who presents with brief, resolved episode of palpitations.  On arrival he is hypertensive but otherwise has normal vital signs.  Patient placed on telemetry.  His labs are overall reassuring without electrolyte derangement.  He has a normal troponin.  EKG without signs of ischemia, heart block, dysthymias, WPW,  Brugada Syndrome, prolonged QT, HOCM or erythemogenic right ventricular dysplasia.  Specifically, he does not have any evidence of supraventricular tachycardia.  Chest x-ray without acute cardiopulmonary process.  Considered ACS however unlikely with nonischemic EKG, negative troponin and he is low risk with a heart score (2).   I doubt pulmonary embolism as he has no risk factors and no associated chest pain, shortness of breath, dyspnea or tachycardia.  During his time in the emergency department he did not have any recurrence of his symptoms or dysrhythmia noted on telemetry.   Patient is visiting from [place] and I advised that he call his cardiologist in order to obtain close follow-up for possible event monitor.  He expresses understanding and advised to return if worsening.    FINAL IMPRESSION  1. Palpitations        2. Hypertension, unspecified type        3. Hypokalemia                This dictation was created using voice recognition software. The accuracy of the dictation is limited to the abilities of the software.  The nursing notes were reviewed and certain aspects of this information were incorporated into this note.      Electronically signed by: Rosaura Peralta M.D., 11/5/2022 11:19 PM

## 2022-11-06 NOTE — ED NOTES
Pt placed on cardiac monitor, continuous pulse ox and BP. Call light in reach, side rails up, bed locked and in lowest position, warm blanket provided. Denies any needs at this time. No acute distress noted. Tech at bedside doing EKG

## 2022-11-06 NOTE — DISCHARGE INSTRUCTIONS
Please call your cardiologist to arrange close follow-up.  He did not see any signs to suggest SVT today however you might need an event monitor which will look at your heart rhythm over a longer period of time.  Potassium is also slightly low so please a eat potassium containing foods such as orange juice and bananas.  Please also follow-up with your primary doctor as your blood pressure was quite elevated today and your medications might need to be adjusted.

## 2022-11-06 NOTE — ED NOTES
Downtime Charting: repeat EKG done. Patient discharged with instruction. Verbalized understanding.